# Patient Record
Sex: FEMALE | Race: WHITE | NOT HISPANIC OR LATINO | Employment: OTHER | URBAN - METROPOLITAN AREA
[De-identification: names, ages, dates, MRNs, and addresses within clinical notes are randomized per-mention and may not be internally consistent; named-entity substitution may affect disease eponyms.]

---

## 2017-07-24 ENCOUNTER — ALLSCRIPTS OFFICE VISIT (OUTPATIENT)
Dept: OTHER | Facility: OTHER | Age: 42
End: 2017-07-24

## 2017-07-24 DIAGNOSIS — Z12.31 ENCOUNTER FOR SCREENING MAMMOGRAM FOR MALIGNANT NEOPLASM OF BREAST: ICD-10-CM

## 2017-07-24 DIAGNOSIS — F41.1 GENERALIZED ANXIETY DISORDER: ICD-10-CM

## 2017-07-24 DIAGNOSIS — E66.8 OTHER OBESITY: ICD-10-CM

## 2017-07-24 DIAGNOSIS — Z87.39 PERSONAL HISTORY OF OTHER DISEASES OF THE MUSCULOSKELETAL SYSTEM AND CONNECTIVE TISSUE: ICD-10-CM

## 2017-09-21 ENCOUNTER — GENERIC CONVERSION - ENCOUNTER (OUTPATIENT)
Dept: OTHER | Facility: OTHER | Age: 42
End: 2017-09-21

## 2017-09-22 ENCOUNTER — GENERIC CONVERSION - ENCOUNTER (OUTPATIENT)
Dept: OTHER | Facility: OTHER | Age: 42
End: 2017-09-22

## 2017-09-22 LAB
BUN SERPL-MCNC: 15 MG/DL (ref 6–24)
BUN/CREA RATIO (HISTORICAL): 18 (ref 9–23)
CALCIUM SERPL-MCNC: 9.4 MG/DL (ref 8.7–10.2)
CHLORIDE SERPL-SCNC: 102 MMOL/L (ref 96–106)
CHOLEST SERPL-MCNC: 137 MG/DL (ref 100–199)
CO2 SERPL-SCNC: 23 MMOL/L (ref 18–29)
CREAT SERPL-MCNC: 0.84 MG/DL (ref 0.57–1)
EGFR AFRICAN AMERICAN (HISTORICAL): 98 ML/MIN/1.73
EGFR-AMERICAN CALC (HISTORICAL): 85 ML/MIN/1.73
GLUCOSE SERPL-MCNC: 89 MG/DL (ref 65–99)
HDLC SERPL-MCNC: 55 MG/DL
LDLC SERPL CALC-MCNC: 70 MG/DL (ref 0–99)
POTASSIUM SERPL-SCNC: 5.4 MMOL/L (ref 3.5–5.2)
SODIUM SERPL-SCNC: 140 MMOL/L (ref 134–144)
TRIGL SERPL-MCNC: 60 MG/DL (ref 0–149)

## 2017-09-25 ENCOUNTER — APPOINTMENT (OUTPATIENT)
Dept: LAB | Age: 42
End: 2017-09-25
Payer: COMMERCIAL

## 2017-09-25 ENCOUNTER — ALLSCRIPTS OFFICE VISIT (OUTPATIENT)
Dept: OTHER | Facility: OTHER | Age: 42
End: 2017-09-25

## 2017-09-25 ENCOUNTER — TRANSCRIBE ORDERS (OUTPATIENT)
Dept: ADMINISTRATIVE | Age: 42
End: 2017-09-25

## 2017-09-25 DIAGNOSIS — E87.5 HYPERPOTASSEMIA: Primary | ICD-10-CM

## 2017-09-25 DIAGNOSIS — E87.5 HYPERPOTASSEMIA: ICD-10-CM

## 2017-09-25 DIAGNOSIS — E87.5 HYPERKALEMIA: ICD-10-CM

## 2017-09-25 LAB — POTASSIUM SERPL-SCNC: 4 MMOL/L (ref 3.5–5.3)

## 2017-09-25 PROCEDURE — 84132 ASSAY OF SERUM POTASSIUM: CPT

## 2017-09-25 PROCEDURE — 36415 COLL VENOUS BLD VENIPUNCTURE: CPT

## 2017-09-26 ENCOUNTER — GENERIC CONVERSION - ENCOUNTER (OUTPATIENT)
Dept: OTHER | Facility: OTHER | Age: 42
End: 2017-09-26

## 2018-01-10 NOTE — PROGRESS NOTES
Chief Complaint  Pt presents to the office for routine follow up after undergoing Left L5-S1 minimally invasive microdiscectomy with JEROD  History of Present Illness  HPI: Pt presents for her two week post-op incision check  She reports to be doing well  Her previous pain in L buttock and down left leg into foot has resolved  She is currently only experiencing discomfort in her L heel  Aleve is effective for pain  No c/o drainage, or other s/s of infection  Denies c/o bowel or bladder  She is ambulating cautiously but with a steady gate  She remains compliant with activity restrictions  Active Problems    1  Back pain with left-sided sciatica (724 3) (M54 32)   2  Encounter for screening mammogram for breast cancer (V76 12) (Z12 31)   3  Fatigue (780 79) (R53 83)   4  Generalized anxiety disorder (300 02) (F41 1)   5  History of low back pain (V13 59) (Z87 39)   6  Intervertebral disc disorders with radiculopathy, lumbosacral region (724 4) (M51 17)   7  Lumbar degenerative disc disease (722 52) (M51 36)   8  Lumbar radiculopathy (724 4) (M54 16)   9  Muscle cramps (729 82) (R25 2)   10  Unstable balance (781 2) (R26 89)    Current Meds   1  Cyclobenzaprine HCl - 5 MG Oral Tablet; TAKE 1 TABLET EVERY 8 HOURS AS   NEEDED FOR PAIN - MAY CAUSE DROWSINESS; Therapy: [id number] to (Last Rx:89Tct2849)  Requested for: 15Apr2016 Ordered   2  Multiple Vitamin Oral Tablet; TAKE 1 TABLET DAILY; Therapy: (Recorded:46Pej9950) to Recorded   3  Naproxen 500 MG Oral Tablet; TAKE 1 TABLET Every twelve hours PRN pain - take with   food; Therapy: 50EZH6525 to (Bette Hunting)  Requested for: 15Apr2016; Last   Rx:[id number] Ordered   4  Natural Bitterness Powder; herbal powder: long dan pat, chetan zi, [facility], [address], [place], [place], [place], [place], [address] [facility] [address], [place] -- twice daily; Therapy: (Recorded:48Sbo4856) to Recorded    Allergies    1  No Known Drug Allergies    2   Bee sting    Vitals  Signs [Data Includes: Current Encounter]    Temperature: 98 7 F, Tympanic  Heart Rate: 88, L Brachial Artery  Pulse Quality: Normal, L Brachial Artery  Respiration: 16  Respiration Quality: Normal  Systolic: 270, LUE, Sitting  Diastolic: 78, LUE, Sitting  Height: 5 ft 6 in  Weight: 222 lb   BMI Calculated: 35 83  BSA Calculated: 2 09    Procedure  The wound was located on the (Midline lumbar)  Wound Exam: well healed with no sign of infection, Incision is clean, dry, and intact with moderate scabbing noted  There was mild erythema around the wound edges  Procedure Note:   Patient Status:  the patient tolerated the procedure well  Complications:  there were no complications  Assessment    1  Postop check (V67 00) (Z09)    Plan  Postop check    · Follow-up Visit in 4 Weeks Evaluation and Treatment  Follow-up  Status: Complete -  Retrospective By Protocol Authorization  Done: [id number]    Discussion/Summary    Routine incision care reviewed  Wash with mild soap and water, and pat dry  Do not immerse under water such as in bath tub, hot tub, or pool  Watch for s/s of infection such as increased redness, drainage, gapping of incision, chills, and fever greater than 101 and call the office or present to the ED  F/U as previously scheduled  Future Appointments    Date/Time Provider Specialty Site   07/19/2016 10:15 AM Sabrina Litten, M D   [address] [place] [address] NEUROSURGICAL     Signatures   Electronically signed by : Gisselle Scott, ; Jun 21 2016  2:52PM EST                       (Author)    Electronically signed by : LUIZA Melendez ; Jun 21 2016  2:59PM EST

## 2018-01-12 NOTE — MISCELLANEOUS
Provider Comments  Provider Comments:   Pt  a no show for 45 min EDX appointment   noted that pt  is scheduled for surgery        Signatures   Electronically signed by : Savita Landers DO; May  2 2016  3:00PM EST                       (Author)

## 2018-01-13 VITALS
HEIGHT: 66 IN | OXYGEN SATURATION: 98 % | WEIGHT: 227 LBS | HEART RATE: 100 BPM | RESPIRATION RATE: 18 BRPM | SYSTOLIC BLOOD PRESSURE: 118 MMHG | TEMPERATURE: 97.6 F | DIASTOLIC BLOOD PRESSURE: 80 MMHG | BODY MASS INDEX: 36.48 KG/M2

## 2018-01-13 NOTE — CONSULTS
Assessment    1  Lumbar radiculopathy (724 4) (M54 16)    Plan  Back pain with left-sided sciatica, Lumbar radiculopathy    · 1 - Isamar Chapman MD, Inessa BENAVIDEZ (Pain Management) Physician Referral  Consult consider ITALIA  clearly has left S-1 radiculopathy  Status: Active  Requested for: [id number]  Care Summary provided  : Yes  Lumbar radiculopathy    · MethylPREDNISolone (Hollis) 4 MG Oral Tablet; TAKE AS DIRECTED ON PATIENT  INSTRUCTION CARD   · EMG ONE EXTREMITY WITH OR W/O RELATED PARASPINAL AREAS; Status:Hold For -  Scheduling; Requested for:31Mar2016;   ONE : LLE    Discussion/Summary  Impression: low back pain, disc herniation and radiculopathy  Currently, the condition is unchanged  The diagnostic plan includes electromyography  Medication changes are as documented in orders  continue cyclobenzaprine  Treatment plan includes pain medicine consultation  Patient discussion: discussed with the patient, stop naproxyn while on methylprednisolone  Chief Complaint  LBP and left leg Sx  Consult from Dr Liliane Hull      History of Present Illness  37 yo female with left radicular pain, radiates to left foot in plantar and lateral foot  Onset October 2015  Has had chiropractic, and acupressure  Seen in ED [facility] and MRI done, 3/20 has been taking NSAIDS, opioids, and also prednisone form [facility]  No bowel or bladder incontinence,   [facility] referred to [address] [place] [address] [address] [facility]  Review of Systems    Constitutional: No fever, no chills, feels well, no tiredness, no recent weight gain or loss  Eyes: No complaints of eyesight problems, no red eyes  ENT: no loss of hearing, no nosebleeds, no sore throat  Cardiovascular: No complaints of chest pain, no palpitations, no leg claudication or lower extremity edema  Respiratory: no compliants of shortness of breath, no wheezing, no cough  Gastrointestinal: constipation  Genitourinary: no complaints of dysuria, no incontinence  Musculoskeletal: as noted in HPI     Integumentary: no complaints of skin rash or lesion, no itching or dry skin, no skin wounds  Neurological: as noted in HPI  Psychiatric: No suicidal thoughts, no anxiety, no feelings of depression  Active Problems    1  Back pain with left-sided sciatica (724 3) (M54 32)   2  Chest pain (786 50) (R07 9)   3  Fatigue (780 79) (R53 83)   4  Generalized anxiety disorder (300 02) (F41 1)   5  History of low back pain (V13 59) (Z87 39)   6  Iron deficiency anemia (280 9) (D50 9)   7  Nonvenomous Insect Bite Of Forearm (913 4)   8  Palpitations (785 1) (R00 2)   9  Shortness of breath (786 05) (R06 02)    Past Medical History    1  History of Anxiety (300 00) (F41 9)    The active problems and past medical history were reviewed and updated today  Surgical History    1  History of  Section    The surgical history was reviewed and updated today  Family History    1  Family history of diabetes mellitus (V18 0) (Z83 3)    2  Family history of hypertension (V17 49) (Z82 49)   3  Family history of Heart problem    4  Family history of lung cancer (V16 1) (Z80 1)    5  Family history of Diabetes Mellitus (V18 0)   6  Family history of Hypertension (V17 49)    The family history was reviewed and updated today  Social History    · Currently working   · Never A Smoker  The social history was reviewed and is unchanged  Current Meds   1  Cyclobenzaprine HCl - 5 MG Oral Tablet; TAKE 1 TABLET EVERY 8 HOURS AS NEEDED   FOR PAIN - MAY CAUSE DROWSINESS; Therapy: 45LXQ7387 to (Gabriela Shea)  Requested for: 2016; Last   Rx:2016 Ordered   2  Flexeril 10 MG TABS; TAKE 1 TABLET EVERY 8 HOURS AS NEEDED; Therapy: (Roopa Lavender) to Recorded   3  Hydrocodone-Acetaminophen 5-325 MG Oral Tablet; take 1 tablet every 12 hours as   needed for pain; Therapy: 76RBS1397 to (Evaluate:2016); Last Rx:2016 Ordered   4  Multiple Vitamin Oral Tablet Recorded   5   Naproxen 500 MG Oral Tablet; TAKE 1 TABLET Every twelve hours PRN pain - take with   food; Therapy: 06IFD7359 to (Evaluate:14Apr2016)  Requested for: 22Mar2016; Last   Rx:22Mar2016 Ordered    The medication list was reviewed and updated today  Allergies    1  No Known Drug Allergies    Vitals  Signs [Data Includes: Current Encounter]   Recorded: 64PWS5690 02:03PM   Heart Rate: 438  Systolic: 427  Diastolic: 96  Height: 5 ft 6 in  Weight: 220 lb 2 08 oz  BMI Calculated: 35 53  BSA Calculated: 2 09    Physical Exam  unable to single limb plantarflex  SLR actually Neg  Constitutional - General appearance: Abnormal  overweight  Neurologic - Cranial nerves: Normal  Reflexes: Abnormal  Deep tendon reflexes: 0 left ankle jerk2+ right biceps, 2+ left biceps, 2+ right triceps, 2+ left triceps, 2+ right brachioradialis, 2+ left brachioradialis, 2+ right patella, 2+ left patella, 2+ right ankle jerk, no ankle clonus on the right and no ankle clonus on the left  Sensation: Abnormal diminished left S-1  Psychiatric - Orientation to person, place, and time: Normal  Mood and affect: Abnormal  Mood and Affect: silly and nervous laughter (?)  Eyes   Pupils and irises: Normal   (did not take opioids today  ) Pupils:  did not take pain medicine today  pupils mid point  Cornea, Lens, and Sclera:      Results/Data    Diagnostic Review extruded disc at L-5/S-1 displacement left S-1        Future Appointments    Date/Time Provider Specialty Site   04/15/2016 08:30 AM Henri Anderson DO Internal Medicine [facility] INTERNAL MED     Signatures   Electronically signed by : Princess Callahan DO; Mar 31 2016  2:46PM EST                       (Author)

## 2018-01-14 VITALS
RESPIRATION RATE: 16 BRPM | TEMPERATURE: 98.6 F | DIASTOLIC BLOOD PRESSURE: 62 MMHG | OXYGEN SATURATION: 98 % | BODY MASS INDEX: 36.8 KG/M2 | WEIGHT: 228 LBS | HEART RATE: 96 BPM | SYSTOLIC BLOOD PRESSURE: 102 MMHG

## 2018-01-15 NOTE — RESULT NOTES
Verified Results  (1) POTASSIUM [id number] 09:55AM Ardeen Liter     Test Name Result Flag Reference   POTASSIUM 4 0 mmol/L  3 5-5 3

## 2018-01-15 NOTE — MISCELLANEOUS
Message  S/w pt on telephone after multiple attempts to reach patient over the past couple of days  Patient reports feeling well  She stated that she has not taken any pain medications, but she has been applying ice here and there  Dressing is D&I  Stated that she had some nausea yesterday but denied any vomiting/fever/chills/s&s infection  Verified follow up appointment date and time with patient and advised her to call our office if she has any questions or concerns  Patient verbalized understanding        Signatures   Electronically signed by : Peng Grace RN; Jun 8 2016  3:53PM EST                       (Author)
28-Nov-2017

## 2018-01-17 NOTE — MISCELLANEOUS
Message  L/M for pt to review preop instructions for surgery scheduled 6/6/16 with Dr Terry Los  Active Problems    1  Back pain with left-sided sciatica (724 3) (M54 32)   2  Encounter for screening mammogram for breast cancer (V76 12) (Z12 31)   3  Fatigue (780 79) (R53 83)   4  Generalized anxiety disorder (300 02) (F41 1)   5  History of low back pain (V13 59) (Z87 39)   6  Intervertebral disc disorders with radiculopathy, lumbosacral region (724 4) (M51 17)   7  Lumbar degenerative disc disease (722 52) (M51 36)   8  Lumbar radiculopathy (724 4) (M54 16)   9  Muscle cramps (729 82) (R25 2)   10  Unstable balance (781 2) (R26 89)    Current Meds   1  Cyclobenzaprine HCl - 5 MG Oral Tablet; TAKE 1 TABLET EVERY 8 HOURS AS   NEEDED FOR PAIN - MAY CAUSE DROWSINESS; Therapy: [id number] to (Last Rx:41Heu3248)  Requested for: 15Apr2016 Ordered   2  Multiple Vitamin Oral Tablet; TAKE 1 TABLET DAILY; Therapy: (Recorded:50Tff7661) to Recorded   3  Naproxen 500 MG Oral Tablet; TAKE 1 TABLET Every twelve hours PRN pain - take with   food; Therapy: 76QBQ5165 to (Familiaonica Deni)  Requested for: 15Apr2016; Last   Rx:[id number] Ordered   4  Natural Bitterness Powder; herbal powder: long dan pat, chetan deutsch, [facility], [address], [place], [place], [place], [place], [address] [place] -- twice daily; Therapy: (Recorded:01Ezp8977) to Recorded    Allergies    1  No Known Drug Allergies    2   Bee sting    Signatures   Electronically signed by : Echo Rdz RN; Jun 2 2016 11:11AM EST                       (Author)

## 2018-06-19 ENCOUNTER — TRANSCRIBE ORDERS (OUTPATIENT)
Dept: ADMINISTRATIVE | Facility: HOSPITAL | Age: 43
End: 2018-06-19

## 2018-06-19 DIAGNOSIS — R40.0 DAYTIME SLEEPINESS: Primary | ICD-10-CM

## 2019-04-17 ENCOUNTER — TELEPHONE (OUTPATIENT)
Dept: INTERNAL MEDICINE CLINIC | Facility: CLINIC | Age: 44
End: 2019-04-17

## 2019-08-16 ENCOUNTER — OFFICE VISIT (OUTPATIENT)
Dept: INTERNAL MEDICINE CLINIC | Facility: CLINIC | Age: 44
End: 2019-08-16
Payer: COMMERCIAL

## 2019-08-16 VITALS
OXYGEN SATURATION: 98 % | TEMPERATURE: 98.6 F | HEIGHT: 66 IN | RESPIRATION RATE: 16 BRPM | WEIGHT: 240.6 LBS | HEART RATE: 94 BPM | DIASTOLIC BLOOD PRESSURE: 80 MMHG | BODY MASS INDEX: 38.67 KG/M2 | SYSTOLIC BLOOD PRESSURE: 126 MMHG

## 2019-08-16 DIAGNOSIS — F41.1 GENERALIZED ANXIETY DISORDER: Primary | ICD-10-CM

## 2019-08-16 DIAGNOSIS — M62.838 MUSCLE SPASM: ICD-10-CM

## 2019-08-16 DIAGNOSIS — Z12.31 ENCOUNTER FOR SCREENING MAMMOGRAM FOR BREAST CANCER: ICD-10-CM

## 2019-08-16 DIAGNOSIS — M51.36 LUMBAR DEGENERATIVE DISC DISEASE: ICD-10-CM

## 2019-08-16 DIAGNOSIS — M51.17 INTERVERTEBRAL DISC DISORDERS WITH RADICULOPATHY, LUMBOSACRAL REGION: ICD-10-CM

## 2019-08-16 DIAGNOSIS — M54.2 NECK PAIN: ICD-10-CM

## 2019-08-16 PROBLEM — E87.5 SERUM POTASSIUM ELEVATED: Status: ACTIVE | Noted: 2017-09-22

## 2019-08-16 PROBLEM — E87.5 SERUM POTASSIUM ELEVATED: Status: RESOLVED | Noted: 2017-09-22 | Resolved: 2019-08-16

## 2019-08-16 PROCEDURE — 1036F TOBACCO NON-USER: CPT | Performed by: INTERNAL MEDICINE

## 2019-08-16 PROCEDURE — 3008F BODY MASS INDEX DOCD: CPT | Performed by: INTERNAL MEDICINE

## 2019-08-16 PROCEDURE — 99214 OFFICE O/P EST MOD 30 MIN: CPT | Performed by: INTERNAL MEDICINE

## 2019-08-16 RX ORDER — CYCLOBENZAPRINE HCL 10 MG
10 TABLET ORAL DAILY PRN
Qty: 30 TABLET | Refills: 0 | Status: SHIPPED | OUTPATIENT
Start: 2019-08-16 | End: 2020-01-29 | Stop reason: SDUPTHER

## 2019-08-16 RX ORDER — BUPROPION HYDROCHLORIDE 150 MG/1
150 TABLET ORAL DAILY
Qty: 30 TABLET | Refills: 1 | Status: SHIPPED | OUTPATIENT
Start: 2019-08-16 | End: 2019-10-17 | Stop reason: SDUPTHER

## 2019-08-16 RX ORDER — VITAMIN B COMPLEX
1 CAPSULE ORAL DAILY
COMMUNITY

## 2019-08-16 RX ORDER — BUSPIRONE HYDROCHLORIDE 5 MG/1
5 TABLET ORAL 3 TIMES DAILY PRN
Qty: 30 TABLET | Refills: 1 | Status: SHIPPED | OUTPATIENT
Start: 2019-08-16 | End: 2020-01-29 | Stop reason: SDUPTHER

## 2019-08-16 NOTE — PATIENT INSTRUCTIONS
Start taking Wellbutrin (bupropion) daily  May take buspirone as needed in about a week  Relaxation and Meditation   WHAT YOU NEED TO KNOW:   Relaxation and meditation can help decrease pain, stress, and anxiety  Relaxation and meditation also help regulate your breathing and decrease your blood pressure and heartbeat  DISCHARGE INSTRUCTIONS:   Types of relaxation:   · Slow, deep breathing  can help relax your body and mind  Deep breathing can be done at any time  · Progressive muscle relaxation  decreases tense muscles  With this therapy, you relax certain muscle groups until your entire body is relaxed  Start at one end of your body and move to the other end, such as from your feet to your head  · Autogenic training, or self-control relaxation , helps increase the blood flow to your limbs and helps you sleep  With this therapy, you try to replace painful or uncomfortable thoughts and feelings with pleasant ones  · Guided imagery  uses your imagination to help you feel peaceful and calm  You try to see, hear, smell, and taste things that you picture in your mind  For example, you might imagine lying on a beach, feeling the sand, and hearing the waves  · Distraction  uses activities you enjoy to help you take your mind off of pain, stress, or anxiety  Distraction may include, listening to music, painting, reading a book, or exercise  Types of meditation:  Meditation is a mind exercise that helps to relax your body and free your mind of worry  You sit quietly in a comfortable position, close your eyes, and relax your muscles  Your thoughts are relaxed while your body stays alert  Meditation can be done alone or with other people  · Mantra meditation  is when you think or speak a certain word or phrase over and over  The word or phrase often has a smooth sound  The mantra is used as a way to help you focus   The sound is believed to make vibrations that have different effects on people  · Mindfulness meditation  is when you focus on what is happening in your life at that point in time  You become aware of your thoughts and feelings in the present without making any judgment  You learn not to worry about your past and future  © 2017 [address] [employer] Information is for End User's use only and may not be sold, redistributed or otherwise used for commercial purposes  All illustrations and images included in CareNotes® are the copyrighted property of A D A M , Inc  or G[employer]alo Sutton  The above information is an  only  It is not intended as medical advice for individual conditions or treatments  Talk to your doctor, nurse or pharmacist before following any medical regimen to see if it is safe and effective for you

## 2019-08-16 NOTE — PROGRESS NOTES
Assessment/Plan:    Generalized anxiety disorder  Start bupropion, Buspar prn  Continue with therapist weekly  Discussed relaxation techniques  Neck pain  More frequent pain with radicular symptoms, check x-ray  Intervertebral disc disorders with radiculopathy, lumbosacral region  Stable  Muscle spasm  Restart Flexeril, takes as needed  May continue with ibuprofen prn, limit use of Excedrin  Diagnoses and all orders for this visit:    Generalized anxiety disorder  -     buPROPion (WELLBUTRIN XL) 150 mg 24 hr tablet; Take 1 tablet (150 mg total) by mouth daily  -     busPIRone (BUSPAR) 5 mg tablet; Take 1 tablet (5 mg total) by mouth 3 (three) times a day as needed (anxiety)    Neck pain  -     Vitamin B12  -     XR spine cervical complete 4 or 5 vw non injury; Future  -     cyclobenzaprine (FLEXERIL) 10 mg tablet; Take 1 tablet (10 mg total) by mouth daily as needed for muscle spasms    Encounter for screening mammogram for breast cancer  -     Mammo screening bilateral w cad; Future    Intervertebral disc disorders with radiculopathy, lumbosacral region  -     Vitamin B12  -     cyclobenzaprine (FLEXERIL) 10 mg tablet; Take 1 tablet (10 mg total) by mouth daily as needed for muscle spasms    Muscle spasm  -     cyclobenzaprine (FLEXERIL) 10 mg tablet; Take 1 tablet (10 mg total) by mouth daily as needed for muscle spasms    Lumbar degenerative disc disease    Other orders  -     b complex vitamins capsule; Take 1 capsule by mouth daily  -     other medication, see sig,; Adren- Plus 2x daily  -     L-THEANINE PO; Take by mouth 2 (two) times a day  -     other medication, see sig,; Natural T- relief 4x daily  -     Boswellia Radha (BOSWELLIA PO); Take 400 mg by mouth daily  -     Discontinue: MAGNESIUM PO; Take by mouth  -     other medication, see sig,; Brain Support  -     other medication, see sig,; AnxioCalm PRN  -     Aspirin-Acetaminophen-Caffeine (EXCEDRIN PO);  Take by mouth      Follow up in 6 weeks or as needed  Subjective:      Patient ID: Georgiana Dwyer is a 39 y o  female  Joann Ambriz has several concerns  First, she complains of intermittent low back pain  She had surgery about 3 years ago, would experience occasional numbness in pain  Denies any radiating pain down her legs  She would take Excedrin and Motrin as needed  Second, she complains of more frequent neck pain, frequently radiating down her left shoulder and hands  Denies any recent injury  She reports taking Excedrin, 2 tablets with Motrin at least once a week  She used to take Flexeril in the past for muscle spasms, reports that spasms have been worse in her neck and low back area  Third, she complains of more frequent panic attacks  She admits having anxiety in the past   She had tried Paxil and Lexapro which caused side effects  She is seeing a therapist weekly who suggested that she start Ativan  She has no issues with sleep  She reports panic attacks would occur at least once a week  She thinks it is related to her menstrual cycle  Lastly, she reports waking up in the middle of the night and unable to move  This has happened in the past, has not occurred recently  She did see a naturopath, prescribed vitamins including magnesium  This caused diarrhea  She has not been taking her supplements regularly  The following portions of the patient's history were reviewed and updated as appropriate: allergies, current medications, past medical history, past social history and problem list     Review of Systems   Constitutional: Negative for fatigue  Respiratory: Negative for cough and shortness of breath  Cardiovascular: Negative for chest pain and palpitations  Musculoskeletal: Positive for arthralgias, back pain, neck pain and neck stiffness  Neurological: Positive for numbness  Negative for dizziness and headaches  Psychiatric/Behavioral: Negative for agitation, dysphoric mood and sleep disturbance  The patient is nervous/anxious  Objective:      /80   Pulse 94   Temp 98 6 °F (37 °C)   Resp 16   Ht 5' 6" (1 676 m)   Wt 109 kg (240 lb 9 6 oz)   SpO2 98%   BMI 38 83 kg/m²          Physical Exam   Constitutional: She is oriented to person, place, and time  She appears well-developed and well-nourished  HENT:   Head: Normocephalic and atraumatic  Nose: Nose normal    Eyes: Pupils are equal, round, and reactive to light  Conjunctivae are normal    Neck: Neck supple  Cardiovascular: Normal rate, regular rhythm and normal heart sounds  Pulmonary/Chest: Effort normal and breath sounds normal  She has no wheezes  She has no rales  Abdominal: Soft  Bowel sounds are normal    Musculoskeletal: She exhibits no edema  Cervical back: She exhibits spasm  Thoracic back: She exhibits spasm  Lumbar back: She exhibits spasm  Neurological: She is alert and oriented to person, place, and time  Skin: Skin is warm  No rash noted  Psychiatric: Her behavior is normal  Her mood appears anxious  Her speech is rapid and/or pressured  Nursing note and vitals reviewed  BMI Counseling: Body mass index is 38 83 kg/m²  Discussed the patient's BMI with her  The BMI is above average  BMI counseling and education was provided to the patient  Nutrition recommendations include reducing portion sizes and 3-5 servings of fruits/vegetables daily

## 2019-09-24 LAB — VIT B12 SERPL-MCNC: 555 PG/ML (ref 200–1100)

## 2019-09-25 ENCOUNTER — OFFICE VISIT (OUTPATIENT)
Dept: INTERNAL MEDICINE CLINIC | Facility: CLINIC | Age: 44
End: 2019-09-25
Payer: COMMERCIAL

## 2019-09-25 VITALS
TEMPERATURE: 98.7 F | HEIGHT: 66 IN | BODY MASS INDEX: 38.57 KG/M2 | SYSTOLIC BLOOD PRESSURE: 126 MMHG | RESPIRATION RATE: 16 BRPM | HEART RATE: 91 BPM | DIASTOLIC BLOOD PRESSURE: 86 MMHG | OXYGEN SATURATION: 99 % | WEIGHT: 240 LBS

## 2019-09-25 DIAGNOSIS — F32.0 CURRENT MILD EPISODE OF MAJOR DEPRESSIVE DISORDER WITHOUT PRIOR EPISODE (HCC): ICD-10-CM

## 2019-09-25 DIAGNOSIS — F41.1 GENERALIZED ANXIETY DISORDER: Primary | ICD-10-CM

## 2019-09-25 DIAGNOSIS — M62.838 MUSCLE SPASM: ICD-10-CM

## 2019-09-25 PROBLEM — E66.09 CLASS 2 OBESITY DUE TO EXCESS CALORIES WITHOUT SERIOUS COMORBIDITY WITH BODY MASS INDEX (BMI) OF 38.0 TO 38.9 IN ADULT: Status: ACTIVE | Noted: 2019-09-25

## 2019-09-25 PROCEDURE — 99213 OFFICE O/P EST LOW 20 MIN: CPT | Performed by: INTERNAL MEDICINE

## 2019-09-25 PROCEDURE — 3008F BODY MASS INDEX DOCD: CPT | Performed by: INTERNAL MEDICINE

## 2019-09-25 NOTE — PROGRESS NOTES
Assessment/Plan:    Generalized anxiety disorder  Improved, on bupropion  She took buspirone once only  She will continue seeing her therapist weekly  Current mild episode of major depressive disorder without prior episode ([address] )  Symptoms worse since anxiety better controlled  Would increase bupropion to 300 mg, monitor symptoms for now  Consider sleep study also  Neck pain  Improved, since anxiety better controlled  No need to to x-ray for now  Muscle spasm  May take muscle relaxant qHS prn  Diagnoses and all orders for this visit:    Generalized anxiety disorder    Muscle spasm    Current mild episode of major depressive disorder without prior episode [facility])      May continue taking supplements for now  Recommend to take B complex a few days a week  Follow up in 2 months or as needed  Subjective:      Patient ID: Jonathan Gandhi is a 38 y o  female  Stephaniapetey Sim is feeling better  She reports that her anxiety is better controlled  She noticed that she is not experiencing numbness of her hands anymore since anxiety is better  She is not fighting with her  every day anymore, feels more settled down  She reports no further episodes of panic attacks, does not experience palpitations all day  However, she noticed that she is feeling a little bit more depressed  She has no energy when she wakes up in the morning sometimes, did not have this before  She reports sleeping well at night  She continues to see her therapist on a weekly basis  She has been taking Wellbutrin daily  She only took BuSpar 1 time, reports that it caused her to feel very sleepy  She was unsure if it was due to the combination of her other supplements as well  She did take the Flexeril a few times due to muscle spasms  She started taking her natural supplements again since she experience more frequent hot flashes and felt more tired    Since starting it again about 2 weeks ago, she is feeling slightly better  The following portions of the patient's history were reviewed and updated as appropriate: allergies, current medications, past medical history, past social history and problem list     Review of Systems   Constitutional: Negative for activity change and appetite change  Respiratory: Negative for shortness of breath  Cardiovascular: Negative for chest pain and palpitations  Musculoskeletal: Negative for neck pain and neck stiffness  Neurological: Negative for tremors  Psychiatric/Behavioral: Positive for dysphoric mood  Negative for confusion and sleep disturbance  The patient is not nervous/anxious  Objective:      /86   Pulse 91   Temp 98 7 °F (37 1 °C)   Resp 16   Ht 5' 6" (1 676 m)   Wt 109 kg (240 lb)   SpO2 99%   BMI 38 74 kg/m²          Physical Exam   Constitutional: She is oriented to person, place, and time  She appears well-developed and well-nourished  HENT:   Head: Normocephalic  Eyes: Pupils are equal, round, and reactive to light  Pulmonary/Chest: Effort normal    Neurological: She is alert and oriented to person, place, and time  Skin: Skin is warm  Psychiatric: Her mood appears not anxious  Her speech is not rapid and/or pressured  She is not slowed  She does not exhibit a depressed mood  Nursing note and vitals reviewed

## 2019-09-25 NOTE — ASSESSMENT & PLAN NOTE
Improved, on bupropion  She took buspirone once only  She will continue seeing her therapist weekly

## 2019-10-17 DIAGNOSIS — F41.1 GENERALIZED ANXIETY DISORDER: ICD-10-CM

## 2019-10-17 RX ORDER — BUPROPION HYDROCHLORIDE 150 MG/1
150 TABLET ORAL DAILY
Qty: 90 TABLET | Refills: 1 | Status: SHIPPED | OUTPATIENT
Start: 2019-10-17 | End: 2020-04-06 | Stop reason: SDUPTHER

## 2019-10-17 NOTE — TELEPHONE ENCOUNTER
Patient would like 3 month supply if possible      Patient took last one last night needs refilled so she can take tonight

## 2019-11-27 ENCOUNTER — OFFICE VISIT (OUTPATIENT)
Dept: INTERNAL MEDICINE CLINIC | Facility: CLINIC | Age: 44
End: 2019-11-27
Payer: COMMERCIAL

## 2019-11-27 VITALS
WEIGHT: 235.2 LBS | OXYGEN SATURATION: 99 % | BODY MASS INDEX: 37.8 KG/M2 | HEART RATE: 98 BPM | RESPIRATION RATE: 18 BRPM | SYSTOLIC BLOOD PRESSURE: 124 MMHG | DIASTOLIC BLOOD PRESSURE: 88 MMHG | TEMPERATURE: 97.8 F | HEIGHT: 66 IN

## 2019-11-27 DIAGNOSIS — M51.17 INTERVERTEBRAL DISC DISORDERS WITH RADICULOPATHY, LUMBOSACRAL REGION: ICD-10-CM

## 2019-11-27 DIAGNOSIS — F32.0 CURRENT MILD EPISODE OF MAJOR DEPRESSIVE DISORDER WITHOUT PRIOR EPISODE (HCC): Primary | ICD-10-CM

## 2019-11-27 DIAGNOSIS — R79.89 ELEVATED LFTS: ICD-10-CM

## 2019-11-27 DIAGNOSIS — E66.09 CLASS 2 OBESITY DUE TO EXCESS CALORIES WITHOUT SERIOUS COMORBIDITY WITH BODY MASS INDEX (BMI) OF 38.0 TO 38.9 IN ADULT: ICD-10-CM

## 2019-11-27 DIAGNOSIS — F41.1 GENERALIZED ANXIETY DISORDER: ICD-10-CM

## 2019-11-27 PROCEDURE — 99213 OFFICE O/P EST LOW 20 MIN: CPT | Performed by: INTERNAL MEDICINE

## 2019-11-27 NOTE — PROGRESS NOTES
Assessment/Plan:    Generalized anxiety disorder  Stable, rare panic attacks  Maintain on bupropion, may take buspirone prn  Sees counselor weekly  Current mild episode of major depressive disorder without prior episode ([facility] )  Stable  Class 2 obesity due to excess calories without serious comorbidity with body mass index (BMI) of 38 0 to 38 9 in adult  Lost 5 lbs since last visit  Intervertebral disc disorders with radiculopathy, lumbosacral region  Continue regular ROM exercises, may take muscle relaxant prn  Lumbar degenerative disc disease  Stable  Diagnoses and all orders for this visit:    Current mild episode of major depressive disorder without prior episode ([facility] )    Intervertebral disc disorders with radiculopathy, lumbosacral region    Class 2 obesity due to excess calories without serious comorbidity with body mass index (BMI) of 38 0 to 38 9 in adult    Elevated LFTs  Comments:  ALT slightly elevated, may be due to excesive Tylenol use  Orders:  -     Hepatic function panel    Generalized anxiety disorder      Follow up in 2 months or as needed  Subjective:      Patient ID: Gucci Villatoro is a 38 y o  female  Pauline Ahn has been doing alright  She reports feeling very stressed this week, had 3 days in a row with increased stress at home and at work  She took buspirone twice in a day, felt better afterwards  It normally causes her to be sleepy but this time it did not  She is otherwise feeling better  Since her last visit 2 months ago, she had about 2 major panic attacks only  She reports more frequent neck pain and soreness  She gave a massage earlier today and attributes the pain to that  She does take the muscle relaxant as needed  She is asking if she needs to continue all the supplements that she has been taking  She recalls that when she stopped taking the adrenal supplements she felt her anxiety worsen  She does see the osteopath next year      The following portions of the patient's history were reviewed and updated as appropriate: allergies, current medications, past medical history, past social history and problem list     Review of Systems   Constitutional: Negative for activity change and appetite change  Respiratory: Negative for shortness of breath  Cardiovascular: Negative for chest pain and palpitations  Genitourinary: Negative for difficulty urinating, dyspareunia, dysuria, enuresis and frequency  Musculoskeletal: Positive for arthralgias, neck pain and neck stiffness  Neurological: Negative for dizziness and headaches  Psychiatric/Behavioral: Positive for dysphoric mood  Negative for agitation, behavioral problems, confusion and sleep disturbance  The patient is nervous/anxious  Objective:      /88   Pulse 98   Temp 97 8 °F (36 6 °C) (Oral)   Resp 18   Ht 5' 6" (1 676 m)   Wt 107 kg (235 lb 3 2 oz)   SpO2 99%   BMI 37 96 kg/m²          Physical Exam   Constitutional: She is oriented to person, place, and time  She appears well-developed and well-nourished  HENT:   Head: Normocephalic  Cardiovascular: Normal rate and regular rhythm  Pulmonary/Chest: Effort normal and breath sounds normal    Neurological: She is alert and oriented to person, place, and time  Skin: Skin is warm  Psychiatric: Her mood appears not anxious  Her speech is not rapid and/or pressured  Cognition and memory are normal  She does not exhibit a depressed mood  Nursing note and vitals reviewed

## 2020-01-28 ENCOUNTER — HOSPITAL ENCOUNTER (OUTPATIENT)
Dept: RADIOLOGY | Age: 45
Discharge: HOME | End: 2020-01-28
Payer: COMMERCIAL

## 2020-01-28 ENCOUNTER — TELEPHONE (OUTPATIENT)
Dept: INTERNAL MEDICINE CLINIC | Facility: CLINIC | Age: 45
End: 2020-01-28

## 2020-01-28 VITALS — BODY MASS INDEX: 37.28 KG/M2 | HEIGHT: 66 IN | WEIGHT: 232 LBS

## 2020-01-28 DIAGNOSIS — Z12.31 ENCOUNTER FOR SCREENING MAMMOGRAM FOR BREAST CANCER: ICD-10-CM

## 2020-01-28 LAB
ALBUMIN SERPL-MCNC: 4.3 G/DL (ref 3.6–5.1)
ALBUMIN/GLOB SERPL: 2 (CALC) (ref 1–2.5)
ALP SERPL-CCNC: 50 U/L (ref 33–115)
ALT SERPL-CCNC: 30 U/L (ref 6–29)
AST SERPL-CCNC: 19 U/L (ref 10–35)
BILIRUB DIRECT SERPL-MCNC: 0.1 MG/DL
BILIRUB INDIRECT SERPL-MCNC: 0.4 MG/DL (CALC) (ref 0.2–1.2)
BILIRUB SERPL-MCNC: 0.5 MG/DL (ref 0.2–1.2)
GLOBULIN SER CALC-MCNC: 2.1 G/DL (CALC) (ref 1.9–3.7)
PROT SERPL-MCNC: 6.4 G/DL (ref 6.1–8.1)

## 2020-01-28 PROCEDURE — 77067 SCR MAMMO BI INCL CAD: CPT

## 2020-01-28 NOTE — TELEPHONE ENCOUNTER
One of your liver enzymes remain a bit high (by one point), better from previous  Nothing to do, we will continue to monitor this  Continue low fat diet  Dorsal Nasal Flap Text: The defect edges were debeveled with a #15 scalpel blade.  Given the location of the defect and the proximity to free margins a dorsal nasal flap was deemed most appropriate.  Using a sterile surgical marker, an appropriate dorsal nasal flap was drawn around the defect.    The area thus outlined was incised deep to adipose tissue with a #15 scalpel blade.  The skin margins were undermined to an appropriate distance in all directions utilizing iris scissors.

## 2020-01-29 ENCOUNTER — OFFICE VISIT (OUTPATIENT)
Dept: INTERNAL MEDICINE CLINIC | Facility: CLINIC | Age: 45
End: 2020-01-29
Payer: COMMERCIAL

## 2020-01-29 VITALS
SYSTOLIC BLOOD PRESSURE: 120 MMHG | RESPIRATION RATE: 18 BRPM | HEIGHT: 66 IN | OXYGEN SATURATION: 96 % | BODY MASS INDEX: 37.86 KG/M2 | HEART RATE: 96 BPM | DIASTOLIC BLOOD PRESSURE: 80 MMHG | TEMPERATURE: 96.6 F | WEIGHT: 235.6 LBS

## 2020-01-29 DIAGNOSIS — M62.838 MUSCLE SPASM: ICD-10-CM

## 2020-01-29 DIAGNOSIS — Z79.899 ENCOUNTER FOR LONG-TERM CURRENT USE OF MEDICATION: Primary | ICD-10-CM

## 2020-01-29 DIAGNOSIS — R79.89 ELEVATED LFTS: ICD-10-CM

## 2020-01-29 DIAGNOSIS — M54.2 NECK PAIN: ICD-10-CM

## 2020-01-29 DIAGNOSIS — E55.9 VITAMIN D INSUFFICIENCY: ICD-10-CM

## 2020-01-29 DIAGNOSIS — Z13.220 SCREENING, LIPID: ICD-10-CM

## 2020-01-29 DIAGNOSIS — F32.0 CURRENT MILD EPISODE OF MAJOR DEPRESSIVE DISORDER WITHOUT PRIOR EPISODE (HCC): ICD-10-CM

## 2020-01-29 DIAGNOSIS — M51.17 INTERVERTEBRAL DISC DISORDERS WITH RADICULOPATHY, LUMBOSACRAL REGION: ICD-10-CM

## 2020-01-29 DIAGNOSIS — F41.1 GENERALIZED ANXIETY DISORDER: ICD-10-CM

## 2020-01-29 PROCEDURE — 99214 OFFICE O/P EST MOD 30 MIN: CPT | Performed by: INTERNAL MEDICINE

## 2020-01-29 PROCEDURE — 3008F BODY MASS INDEX DOCD: CPT | Performed by: INTERNAL MEDICINE

## 2020-01-29 RX ORDER — BUSPIRONE HYDROCHLORIDE 5 MG/1
5 TABLET ORAL 3 TIMES DAILY PRN
Qty: 30 TABLET | Refills: 1 | Status: SHIPPED | OUTPATIENT
Start: 2020-01-29 | End: 2020-07-20 | Stop reason: SDUPTHER

## 2020-01-29 RX ORDER — CYCLOBENZAPRINE HCL 10 MG
10 TABLET ORAL DAILY PRN
Qty: 30 TABLET | Refills: 1 | Status: SHIPPED | OUTPATIENT
Start: 2020-01-29 | End: 2020-08-19 | Stop reason: SDUPTHER

## 2020-01-29 NOTE — ASSESSMENT & PLAN NOTE
Improved, may try taking Buspar more often, especially when she feels like binge eating  She sees her homeopathic doctor regularly

## 2020-01-29 NOTE — PROGRESS NOTES
Assessment/Plan:    Generalized anxiety disorder  Improved, may try taking Buspar more often, especially when she feels like binge eating  She sees her homeopathic doctor regularly  Current mild episode of major depressive disorder without prior episode ([facility] )  Stable  Class 2 obesity due to excess calories without serious comorbidity with body mass index (BMI) of 38 0 to 38 9 in adult  No weight change  Start walking daily  Discussed weight loss medications  Elevated LFTs  Continue low fat diet  ALT improved  Neck pain  Stable, encouraged to take muscle relaxant prn qHS  Diagnoses and all orders for this visit:    Encounter for long-term current use of medication  -     Vitamin B12; Future    Neck pain  -     cyclobenzaprine (FLEXERIL) 10 mg tablet; Take 1 tablet (10 mg total) by mouth daily as needed for muscle spasms    Intervertebral disc disorders with radiculopathy, lumbosacral region  -     cyclobenzaprine (FLEXERIL) 10 mg tablet; Take 1 tablet (10 mg total) by mouth daily as needed for muscle spasms    Muscle spasm  -     cyclobenzaprine (FLEXERIL) 10 mg tablet; Take 1 tablet (10 mg total) by mouth daily as needed for muscle spasms    Generalized anxiety disorder  -     busPIRone (BUSPAR) 5 mg tablet; Take 1 tablet (5 mg total) by mouth 3 (three) times a day as needed (anxiety)    Screening, lipid  -     Lipid panel; Future    Current mild episode of major depressive disorder without prior episode ([facility] )  -     Comprehensive metabolic panel; Future    Vitamin D insufficiency  -     Vitamin D 25 hydroxy; Future    Elevated LFTs      Follow up in 3 months or as needed  Subjective:      Patient ID: Mikael Cooper is a 38 y o  female  Kristopher Jacob reports that she has been experiencing more muscle and joint pains during the past 2 weeks  She has been taking Motrin as needed, about 4 tablets in 4 days  She does not take it regularly      She does not take Buspar often, has helped when she does take it  She finds her self binge eating when feeling anxious  She has not lost weight and is frustrated  She is also nervous about her liver enzymes  She saw her homeopathic doctor this week  She is asking if it is okay to receive HCG shots for weight loss  She would also like to start a water diet for 15 days to help with the weight  The following portions of the patient's history were reviewed and updated as appropriate: allergies, current medications, past medical history, past social history and problem list     Review of Systems   Constitutional: Negative for appetite change and fatigue  HENT: Negative for congestion and ear pain  Respiratory: Negative for cough and shortness of breath  Cardiovascular: Negative for chest pain  Gastrointestinal: Negative for abdominal pain and constipation  Genitourinary: Negative for dysuria  Musculoskeletal: Positive for arthralgias, myalgias, neck pain and neck stiffness  Neurological: Positive for headaches  Negative for dizziness  Psychiatric/Behavioral: Negative for confusion and sleep disturbance  The patient is nervous/anxious  Objective:      /80   Pulse 96   Temp (!) 96 6 °F (35 9 °C) (Oral)   Resp 18   Ht 5' 6" (1 676 m)   Wt 107 kg (235 lb 9 6 oz)   LMP 01/05/2020 (Exact Date)   SpO2 96%   BMI 38 03 kg/m²          Physical Exam   Constitutional: She is oriented to person, place, and time  She appears well-developed and well-nourished  HENT:   Head: Normocephalic and atraumatic  Eyes: Pupils are equal, round, and reactive to light  Cardiovascular: Normal rate, regular rhythm and normal heart sounds  Pulmonary/Chest: Effort normal and breath sounds normal  She has no wheezes  Abdominal: Soft  Bowel sounds are normal    Musculoskeletal: She exhibits no edema  Cervical back: She exhibits spasm  She exhibits normal range of motion, no tenderness and no pain     Neurological: She is alert and oriented to person, place, and time  Skin: Skin is warm  No rash noted  Psychiatric: She has a normal mood and affect  Her behavior is normal    Nursing note and vitals reviewed  Lab results reviewed with patient

## 2020-02-17 ENCOUNTER — HOSPITAL ENCOUNTER (OUTPATIENT)
Dept: MAMMOGRAPHY | Facility: CLINIC | Age: 45
Discharge: HOME | End: 2020-02-17
Payer: COMMERCIAL

## 2020-02-17 ENCOUNTER — HOSPITAL ENCOUNTER (OUTPATIENT)
Dept: ULTRASOUND IMAGING | Facility: CLINIC | Age: 45
Discharge: HOME | End: 2020-02-17
Payer: COMMERCIAL

## 2020-02-17 VITALS — BODY MASS INDEX: 37.77 KG/M2 | HEIGHT: 66 IN | WEIGHT: 235 LBS

## 2020-02-17 DIAGNOSIS — R92.8 ABNORMAL MAMMOGRAM: ICD-10-CM

## 2020-02-17 PROCEDURE — 77065 DX MAMMO INCL CAD UNI: CPT

## 2020-02-17 PROCEDURE — 76642 ULTRASOUND BREAST LIMITED: CPT

## 2020-02-17 PROCEDURE — G0279 TOMOSYNTHESIS, MAMMO: HCPCS

## 2020-04-06 ENCOUNTER — TELEMEDICINE (OUTPATIENT)
Dept: INTERNAL MEDICINE CLINIC | Facility: CLINIC | Age: 45
End: 2020-04-06
Payer: COMMERCIAL

## 2020-04-06 VITALS
DIASTOLIC BLOOD PRESSURE: 89 MMHG | SYSTOLIC BLOOD PRESSURE: 142 MMHG | TEMPERATURE: 97.9 F | WEIGHT: 235 LBS | HEART RATE: 99 BPM | HEIGHT: 66 IN | BODY MASS INDEX: 37.77 KG/M2

## 2020-04-06 DIAGNOSIS — F41.1 GENERALIZED ANXIETY DISORDER: ICD-10-CM

## 2020-04-06 DIAGNOSIS — J30.2 SEASONAL ALLERGIES: ICD-10-CM

## 2020-04-06 DIAGNOSIS — F32.0 CURRENT MILD EPISODE OF MAJOR DEPRESSIVE DISORDER WITHOUT PRIOR EPISODE (HCC): Primary | ICD-10-CM

## 2020-04-06 PROCEDURE — 99214 OFFICE O/P EST MOD 30 MIN: CPT | Performed by: INTERNAL MEDICINE

## 2020-04-06 RX ORDER — BUPROPION HYDROCHLORIDE 150 MG/1
150 TABLET ORAL DAILY
Qty: 90 TABLET | Refills: 1 | Status: SHIPPED | OUTPATIENT
Start: 2020-04-06 | End: 2020-04-07

## 2020-04-07 DIAGNOSIS — F41.1 GENERALIZED ANXIETY DISORDER: ICD-10-CM

## 2020-04-07 RX ORDER — BUPROPION HYDROCHLORIDE 150 MG/1
TABLET ORAL
Qty: 30 TABLET | Refills: 5 | Status: SHIPPED | OUTPATIENT
Start: 2020-04-07 | End: 2020-10-06 | Stop reason: SDUPTHER

## 2020-04-10 ENCOUNTER — TELEPHONE (OUTPATIENT)
Dept: INTERNAL MEDICINE CLINIC | Facility: CLINIC | Age: 45
End: 2020-04-10

## 2020-04-29 ENCOUNTER — TELEMEDICINE (OUTPATIENT)
Dept: INTERNAL MEDICINE CLINIC | Facility: CLINIC | Age: 45
End: 2020-04-29
Payer: COMMERCIAL

## 2020-04-29 VITALS
HEART RATE: 82 BPM | HEIGHT: 66 IN | TEMPERATURE: 98.4 F | DIASTOLIC BLOOD PRESSURE: 78 MMHG | WEIGHT: 227 LBS | BODY MASS INDEX: 36.48 KG/M2 | SYSTOLIC BLOOD PRESSURE: 131 MMHG

## 2020-04-29 DIAGNOSIS — M79.18 MYOFASCIAL PAIN SYNDROME, CERVICAL: ICD-10-CM

## 2020-04-29 DIAGNOSIS — E66.09 CLASS 2 OBESITY DUE TO EXCESS CALORIES WITHOUT SERIOUS COMORBIDITY WITH BODY MASS INDEX (BMI) OF 38.0 TO 38.9 IN ADULT: ICD-10-CM

## 2020-04-29 DIAGNOSIS — R79.89 ELEVATED LFTS: ICD-10-CM

## 2020-04-29 DIAGNOSIS — F41.1 GENERALIZED ANXIETY DISORDER: Primary | ICD-10-CM

## 2020-04-29 DIAGNOSIS — F32.0 CURRENT MILD EPISODE OF MAJOR DEPRESSIVE DISORDER WITHOUT PRIOR EPISODE (HCC): ICD-10-CM

## 2020-04-29 PROCEDURE — 99213 OFFICE O/P EST LOW 20 MIN: CPT | Performed by: INTERNAL MEDICINE

## 2020-07-20 ENCOUNTER — OFFICE VISIT (OUTPATIENT)
Dept: INTERNAL MEDICINE CLINIC | Facility: CLINIC | Age: 45
End: 2020-07-20
Payer: COMMERCIAL

## 2020-07-20 VITALS
HEIGHT: 66 IN | WEIGHT: 230 LBS | SYSTOLIC BLOOD PRESSURE: 120 MMHG | OXYGEN SATURATION: 98 % | RESPIRATION RATE: 18 BRPM | HEART RATE: 98 BPM | DIASTOLIC BLOOD PRESSURE: 92 MMHG | TEMPERATURE: 97.5 F | BODY MASS INDEX: 36.96 KG/M2

## 2020-07-20 DIAGNOSIS — M79.18 MYOFASCIAL PAIN SYNDROME, CERVICAL: ICD-10-CM

## 2020-07-20 DIAGNOSIS — R45.851 SUICIDAL THOUGHTS: Primary | ICD-10-CM

## 2020-07-20 DIAGNOSIS — F32.0 CURRENT MILD EPISODE OF MAJOR DEPRESSIVE DISORDER WITHOUT PRIOR EPISODE (HCC): ICD-10-CM

## 2020-07-20 DIAGNOSIS — E66.09 CLASS 2 OBESITY DUE TO EXCESS CALORIES WITHOUT SERIOUS COMORBIDITY WITH BODY MASS INDEX (BMI) OF 38.0 TO 38.9 IN ADULT: ICD-10-CM

## 2020-07-20 DIAGNOSIS — F41.1 GENERALIZED ANXIETY DISORDER: ICD-10-CM

## 2020-07-20 PROCEDURE — 99214 OFFICE O/P EST MOD 30 MIN: CPT | Performed by: INTERNAL MEDICINE

## 2020-07-20 PROCEDURE — 1036F TOBACCO NON-USER: CPT | Performed by: INTERNAL MEDICINE

## 2020-07-20 PROCEDURE — 3008F BODY MASS INDEX DOCD: CPT | Performed by: INTERNAL MEDICINE

## 2020-07-20 RX ORDER — BUSPIRONE HYDROCHLORIDE 5 MG/1
5 TABLET ORAL 3 TIMES DAILY PRN
Qty: 90 TABLET | Refills: 0 | Status: SHIPPED | OUTPATIENT
Start: 2020-07-20 | End: 2020-08-14

## 2020-07-20 NOTE — ASSESSMENT & PLAN NOTE
No plan  Stopped seeing therapist due to financial reasons  Fair family / friend support  Given information for suicide hotline

## 2020-07-20 NOTE — PATIENT INSTRUCTIONS
Look into ecouch for CBT (cognitive behavioral therapy)  · Exercise with the person  Exercise can lift his or her mood, increase energy, and make it easier to sleep at night  · Encourage the person to try new things  Adults who are open to new experiences handle stress and change better than those who are not

## 2020-07-20 NOTE — ASSESSMENT & PLAN NOTE
Instructed to take Wellbutrin qHS again  Consider adding venlafaxine (side effects with SSRI)  Encouraged to walk / exercise daily  Instructed not to restart other supplements for now

## 2020-07-20 NOTE — PROGRESS NOTES
Assessment/Plan:    Suicidal thoughts  No plan  Stopped seeing therapist due to financial reasons  Fair family / friend support  Given information for suicide hotline  Current mild episode of major depressive disorder without prior episode ([address] )  Instructed to take Wellbutrin qHS again  Consider adding venlafaxine (side effects with SSRI)  Encouraged to walk / exercise daily  Instructed not to restart other supplements for now  Generalized anxiety disorder  Improved, taking buspirone prn only  Myofascial pain syndrome, cervical  Takes muscle relaxant prn  Class 2 obesity due to excess calories without serious comorbidity with body mass index (BMI) of 38 0 to 38 9 in adult  No weight change  Elevated LFTs  ALT slightly elevated  Diagnoses and all orders for this visit:    Suicidal thoughts    Generalized anxiety disorder  -     busPIRone (BUSPAR) 5 mg tablet; Take 1 tablet (5 mg total) by mouth 3 (three) times a day as needed (anxiety)    Current mild episode of major depressive disorder without prior episode ([facility])    Myofascial pain syndrome, cervical    Class 2 obesity due to excess calories without serious comorbidity with body mass index (BMI) of 38 0 to 38 9 in adult      Follow up in 1 month or as needed  Subjective:      Patient ID: Rupinder Langston is a 38 y o  female  Winifred Roberson has not been doing well in the past month  She reports having suicidal thoughts since about a month ago  She is not sure if it is related to her medication, she started taking Wellbutrin and her supplements in the morning instead of the evening  No new events occurred at home  She reports she has no plan, says she "can imagine it    She stopped seeing her therapist about 2 months ago due to cost   She started working about a month ago, has 4 clients  She remains paranoid about the virus, reports feeling reluctant to return back to work but needs to  She feels happy while at work      She reports occasional palpitations, usually occurs at night  Denies any symptoms during the day  No chest pain or shortness of breath  Two weeks ago, she had a bad headache  She took an extra dose of the muscle relaxant which seems to help her neck pain and headache  The following portions of the patient's history were reviewed and updated as appropriate: allergies, current medications, past medical history, past social history and problem list     Review of Systems   Constitutional: Negative for activity change and appetite change  Respiratory: Negative for chest tightness and shortness of breath  Cardiovascular: Positive for palpitations  Negative for chest pain  Gastrointestinal: Negative for abdominal pain  Musculoskeletal: Positive for neck pain and neck stiffness  Psychiatric/Behavioral: Positive for dysphoric mood and sleep disturbance  The patient is not nervous/anxious  Objective:      /92   Pulse 98   Temp 97 5 °F (36 4 °C) (Tympanic)   Resp 18   Ht 5' 6" (1 676 m)   Wt 104 kg (230 lb)   SpO2 98%   BMI 37 12 kg/m²          Physical Exam   Constitutional: She is oriented to person, place, and time  She appears well-developed and well-nourished  She does not appear ill  No distress  HENT:   Head: Normocephalic  Pulmonary/Chest: Effort normal    Neurological: She is alert and oriented to person, place, and time  Skin: Skin is warm  Psychiatric: She has a normal mood and affect  Her speech is normal and behavior is normal  Thought content is delusional  Thought content is not paranoid  Cognition and memory are normal  She expresses suicidal ideation  She expresses no suicidal plans  She is attentive  Nursing note and vitals reviewed

## 2020-08-14 DIAGNOSIS — F41.1 GENERALIZED ANXIETY DISORDER: ICD-10-CM

## 2020-08-14 RX ORDER — BUSPIRONE HYDROCHLORIDE 5 MG/1
5 TABLET ORAL 3 TIMES DAILY PRN
Qty: 90 TABLET | Refills: 0 | Status: SHIPPED | OUTPATIENT
Start: 2020-08-14 | End: 2021-10-25 | Stop reason: SDUPTHER

## 2020-08-19 ENCOUNTER — OFFICE VISIT (OUTPATIENT)
Dept: INTERNAL MEDICINE CLINIC | Facility: CLINIC | Age: 45
End: 2020-08-19
Payer: COMMERCIAL

## 2020-08-19 VITALS
RESPIRATION RATE: 18 BRPM | OXYGEN SATURATION: 98 % | HEIGHT: 66 IN | HEART RATE: 105 BPM | WEIGHT: 233.4 LBS | TEMPERATURE: 96.5 F | DIASTOLIC BLOOD PRESSURE: 84 MMHG | BODY MASS INDEX: 37.51 KG/M2 | SYSTOLIC BLOOD PRESSURE: 130 MMHG

## 2020-08-19 DIAGNOSIS — E66.09 CLASS 2 OBESITY DUE TO EXCESS CALORIES WITHOUT SERIOUS COMORBIDITY WITH BODY MASS INDEX (BMI) OF 38.0 TO 38.9 IN ADULT: ICD-10-CM

## 2020-08-19 DIAGNOSIS — M54.2 NECK PAIN: ICD-10-CM

## 2020-08-19 DIAGNOSIS — F41.1 GENERALIZED ANXIETY DISORDER: ICD-10-CM

## 2020-08-19 DIAGNOSIS — F32.0 CURRENT MILD EPISODE OF MAJOR DEPRESSIVE DISORDER WITHOUT PRIOR EPISODE (HCC): ICD-10-CM

## 2020-08-19 DIAGNOSIS — M62.838 MUSCLE SPASM: ICD-10-CM

## 2020-08-19 DIAGNOSIS — M51.17 INTERVERTEBRAL DISC DISORDERS WITH RADICULOPATHY, LUMBOSACRAL REGION: ICD-10-CM

## 2020-08-19 DIAGNOSIS — N92.1 MENORRHAGIA WITH IRREGULAR CYCLE: Primary | ICD-10-CM

## 2020-08-19 PROBLEM — N92.0 HEAVY MENSTRUAL BLEEDING: Status: ACTIVE | Noted: 2020-08-19

## 2020-08-19 PROCEDURE — 99214 OFFICE O/P EST MOD 30 MIN: CPT | Performed by: INTERNAL MEDICINE

## 2020-08-19 PROCEDURE — 3008F BODY MASS INDEX DOCD: CPT | Performed by: INTERNAL MEDICINE

## 2020-08-19 PROCEDURE — 1036F TOBACCO NON-USER: CPT | Performed by: INTERNAL MEDICINE

## 2020-08-19 RX ORDER — CYCLOBENZAPRINE HCL 10 MG
10 TABLET ORAL DAILY PRN
Qty: 30 TABLET | Refills: 5 | Status: SHIPPED | OUTPATIENT
Start: 2020-08-19 | End: 2020-12-14 | Stop reason: SDUPTHER

## 2020-08-19 NOTE — ASSESSMENT & PLAN NOTE
Ongoing for the past few weeks, may need CBC  Keep appointment with gynecology next month  Migraine headaches worse since heavy bleeding

## 2020-08-19 NOTE — PROGRESS NOTES
Assessment/Plan:    Suicidal thoughts  Resolved since taking bupropionn in the evening  Monitor for recurrence  Heavy menstrual bleeding  Ongoing for the past few weeks, may need CBC  Keep appointment with gynecology next month  Migraine headaches worse since heavy bleeding  Generalized anxiety disorder  Stable, on bupropion  Takes buspirone prn only  Myofascial pain syndrome, cervical  May continue with muscle relaxant as needed  Current mild episode of major depressive disorder without prior episode ([facility] )  As above  Class 2 obesity due to excess calories without serious comorbidity with body mass index (BMI) of 38 0 to 38 9 in adult  No weight change  Elevated LFTs  Repeat labs later this year  Diagnoses and all orders for this visit:    Menorrhagia with irregular cycle    Neck pain  -     cyclobenzaprine (FLEXERIL) 10 mg tablet; Take 1 tablet (10 mg total) by mouth daily as needed for muscle spasms    Intervertebral disc disorders with radiculopathy, lumbosacral region  -     cyclobenzaprine (FLEXERIL) 10 mg tablet; Take 1 tablet (10 mg total) by mouth daily as needed for muscle spasms    Muscle spasm  -     cyclobenzaprine (FLEXERIL) 10 mg tablet; Take 1 tablet (10 mg total) by mouth daily as needed for muscle spasms    Generalized anxiety disorder    Class 2 obesity due to excess calories without serious comorbidity with body mass index (BMI) of 38 0 to 38 9 in adult    Current mild episode of major depressive disorder without prior episode ([facility] )      Follow up in 2 months or as needed  Subjective:      Patient ID: Kiran Camara is a 38 y o  female  Dai Snide is feeling better, anxiety wise  She started seeing her counselor again, has been seeing her every 2 weeks  Since she switched her Wellbutrin does to early afternoon, she reports suicidal thoughts have resolved  She initially was taking it is at bedtime but this affected her sleep, now taking it late in the afternoon  She takes buspirone as needed, does not take this daily  She feels her mood is much better  She reports heavy menstrual bleeding for the past 3-4 weeks  She has had intermittent spotting since 2 months ago  She has occasional low back and pelvic pain  She reports bleeding is now clots, somewhat lighter  She noted more frequent migraine headaches since constant menstrual bleeding  She does take the muscle relaxant as needed which seems to help  The following portions of the patient's history were reviewed and updated as appropriate: allergies, current medications, past medical history, past social history and problem list     Review of Systems   Constitutional: Negative for activity change and appetite change  Respiratory: Negative for shortness of breath  Cardiovascular: Negative for palpitations  Genitourinary: Positive for pelvic pain and vaginal bleeding  Negative for dysuria  Neurological: Negative for dizziness  Psychiatric/Behavioral: Negative for agitation, confusion, dysphoric mood, sleep disturbance and suicidal ideas  The patient is not nervous/anxious  Objective:      /84   Pulse 105   Temp (!) 96 5 °F (35 8 °C)   Resp 18   Ht 5' 6" (1 676 m)   Wt 106 kg (233 lb 6 4 oz)   SpO2 98%   BMI 37 67 kg/m²          Physical Exam  Vitals signs and nursing note reviewed  Constitutional:       General: She is not in acute distress  Appearance: She is well-developed  HENT:      Head: Normocephalic and atraumatic  Eyes:      Pupils: Pupils are equal, round, and reactive to light  Cardiovascular:      Rate and Rhythm: Normal rate and regular rhythm  Heart sounds: Normal heart sounds  Pulmonary:      Effort: Pulmonary effort is normal       Breath sounds: Normal breath sounds  No wheezing  Neurological:      Mental Status: She is alert and oriented to person, place, and time     Psychiatric:         Mood and Affect: Mood normal  Mood is not anxious or depressed  Speech: Speech normal          Behavior: Behavior normal            BMI Counseling: Body mass index is 37 67 kg/m²  The BMI is above normal  Nutrition recommendations include decreasing overall calorie intake and 3-5 servings of fruits/vegetables daily  Exercise recommendations include exercising 3-5 times per week

## 2020-09-14 ENCOUNTER — OFFICE VISIT (OUTPATIENT)
Dept: OBGYN CLINIC | Facility: CLINIC | Age: 45
End: 2020-09-14
Payer: COMMERCIAL

## 2020-09-14 VITALS
HEIGHT: 66 IN | BODY MASS INDEX: 37.25 KG/M2 | WEIGHT: 231.8 LBS | TEMPERATURE: 98.3 F | SYSTOLIC BLOOD PRESSURE: 128 MMHG | DIASTOLIC BLOOD PRESSURE: 72 MMHG

## 2020-09-14 DIAGNOSIS — N93.9 ABNORMAL UTERINE BLEEDING: Primary | ICD-10-CM

## 2020-09-14 PROCEDURE — 99203 OFFICE O/P NEW LOW 30 MIN: CPT | Performed by: OBSTETRICS & GYNECOLOGY

## 2020-09-15 NOTE — PROGRESS NOTES
Assessment/Plan:   Diagnoses and all orders for this visit:    Abnormal uterine bleeding  -     US pelvis complete w transvaginal; Future    Reviewed with patient recommendation for imaging via ultrasound for any structural cause for change in bleeding pattern  Recommended return to office for EMB for evaluation and pap smear to be performed due to lack of gyn care for last 15 years  Patient would prefer to wait until her EMB procedure for a gyn examination  She is agreeable to having EMB and pap smear performed at next visit  Subjective:    Patient ID: Shasha Foreman is a 54 y o  female  Chief Complaint   Patient presents with    Menstrual Problem     Patient reports period in  that lasted until September  She reports it was very heavy with clots  Patient is a 52yo  presenting today as a new patient for a gyn problem visit  She reports change in her menstrual cycle and bleeding pattern  She reports a long history of irregular menses, though reports her bleeding occurred every month but in an unpredictable pattern  She states that her bleeding changed and she experienced bleeding for approximately 2 months straight  She reports that her bleeding started out heavy for at least 2 weeks and states that she had to change her diva cup every hour due to the extent of her bleeding  She reports that her bleeding has stopped last week  She reports that it has been an extended amount of time since she has seen a gynecologist, approximately 15 years  She denies abnormal pap smears but has not had one in 15 years  She states that she is nervous for gyn exams and would prefer to wait to have an exam until her evaluation is discussed         The following portions of the patient's history were reviewed and updated as appropriate: allergies, current medications, past family history, past medical history, past social history, past surgical history and problem list     Review of Systems   Constitutional: Positive for fatigue  Negative for activity change, appetite change and unexpected weight change  Respiratory: Negative for cough and shortness of breath  Cardiovascular: Negative for chest pain  Gastrointestinal: Negative for abdominal pain, constipation, diarrhea, nausea and vomiting  Genitourinary: Positive for menstrual problem and vaginal bleeding  Negative for difficulty urinating, dyspareunia, frequency, pelvic pain, vaginal discharge and vaginal pain  Neurological: Negative for dizziness, weakness, light-headedness and headaches  Psychiatric/Behavioral: Negative  Objective:  /72   Temp 98 3 °F (36 8 °C)   Ht 5' 6" (1 676 m)   Wt 105 kg (231 lb 12 8 oz)   LMP 06/08/2020 (Approximate)   BMI 37 41 kg/m²   Physical Exam  Constitutional:       General: She is not in acute distress  Appearance: Normal appearance  She is obese  She is not diaphoretic  Genitourinary:      Genitourinary Comments: Deferred on patient's request   HENT:      Head: Normocephalic and atraumatic  Pulmonary:      Effort: Pulmonary effort is normal  No respiratory distress  Neurological:      Mental Status: She is alert and oriented to person, place, and time  Psychiatric:         Mood and Affect: Mood normal          Behavior: Behavior normal          Thought Content:  Thought content normal          Judgment: Judgment normal

## 2020-09-18 ENCOUNTER — HOSPITAL ENCOUNTER (OUTPATIENT)
Dept: ULTRASOUND IMAGING | Facility: HOSPITAL | Age: 45
Discharge: HOME | End: 2020-09-18
Attending: OBSTETRICS & GYNECOLOGY
Payer: COMMERCIAL

## 2020-09-18 DIAGNOSIS — N93.9 ABNORMAL UTERINE BLEEDING: ICD-10-CM

## 2020-09-18 PROCEDURE — 76856 US EXAM PELVIC COMPLETE: CPT

## 2020-09-18 PROCEDURE — 76830 TRANSVAGINAL US NON-OB: CPT

## 2020-09-21 ENCOUNTER — PROCEDURE VISIT (OUTPATIENT)
Dept: OBGYN CLINIC | Facility: CLINIC | Age: 45
End: 2020-09-21
Payer: COMMERCIAL

## 2020-09-21 VITALS
TEMPERATURE: 97.9 F | SYSTOLIC BLOOD PRESSURE: 132 MMHG | DIASTOLIC BLOOD PRESSURE: 74 MMHG | BODY MASS INDEX: 37.77 KG/M2 | WEIGHT: 234 LBS

## 2020-09-21 DIAGNOSIS — Z12.4 ENCOUNTER FOR SCREENING FOR CERVICAL CANCER: Primary | ICD-10-CM

## 2020-09-21 DIAGNOSIS — N93.9 ABNORMAL UTERINE BLEEDING: ICD-10-CM

## 2020-09-21 DIAGNOSIS — Z11.51 SCREENING FOR HPV (HUMAN PAPILLOMAVIRUS): ICD-10-CM

## 2020-09-21 LAB — SL AMB POCT URINE HCG: NEGATIVE

## 2020-09-21 PROCEDURE — 58100 BIOPSY OF UTERUS LINING: CPT | Performed by: OBSTETRICS & GYNECOLOGY

## 2020-09-21 PROCEDURE — 87624 HPV HI-RISK TYP POOLED RSLT: CPT | Performed by: OBSTETRICS & GYNECOLOGY

## 2020-09-21 PROCEDURE — G0145 SCR C/V CYTO,THINLAYER,RESCR: HCPCS | Performed by: OBSTETRICS & GYNECOLOGY

## 2020-09-21 PROCEDURE — 88305 TISSUE EXAM BY PATHOLOGIST: CPT | Performed by: PATHOLOGY

## 2020-09-21 PROCEDURE — 81025 URINE PREGNANCY TEST: CPT | Performed by: OBSTETRICS & GYNECOLOGY

## 2020-09-21 NOTE — PROGRESS NOTES
Endometrial biopsy    Date/Time: 9/21/2020 10:44 AM  Performed by: Buster Albarran MD  Authorized by: Buster Albarran MD     Consent:     Consent obtained:  Verbal and written    Consent given by:  Patient    Procedural risks discussed:  Bleeding, infection, failure rate and repeat procedure (uterine perforation, inadequate sample, cramping pain)    Patient questions answered: yes      Patient agrees, verbalizes understanding, and wants to proceed: yes    Indication:     Indications: Other disorder of menstruation and other abnormal bleeding from female genital tract      Indications comment:  Abnormal uterine bleeding  Procedure:     Procedure: endometrial biopsy with Pipelle      A bivalve speculum was placed in the vagina: yes      Cervix cleaned and prepped: yes      A paracervical block was performed: no      An intracervical block was performed: no      The cervix was dilated: no      Uterus sounded: yes      Uterus sound depth (cm):  11    Specimen collected: specimen collected and sent to pathology      Patient tolerated procedure well with no complications: yes    Findings:     Uterus size:  9-10 weeks    Cervix: normal    Comments:      Pap collected prior to collection of endometrial biopsy as patient has not had gyn care in 15 years  Follow up on 10/5/2020 for annual gyn visit and follow up of results         Problem List Items Addressed This Visit     None      Visit Diagnoses     Encounter for screening for cervical cancer     -  Primary    Relevant Orders    Liquid-based pap, screening    Abnormal uterine bleeding        Relevant Orders    POCT urine HCG (Completed)    Endometrial biopsy    Tissue Exam    Screening for HPV (human papillomavirus)        Relevant Orders    Liquid-based pap, screening

## 2020-10-04 LAB
HPV HR 12 DNA CVX QL NAA+PROBE: NEGATIVE
HPV16 DNA CVX QL NAA+PROBE: NEGATIVE
HPV18 DNA CVX QL NAA+PROBE: NEGATIVE

## 2020-10-05 ENCOUNTER — OFFICE VISIT (OUTPATIENT)
Dept: OBGYN CLINIC | Facility: CLINIC | Age: 45
End: 2020-10-05
Payer: COMMERCIAL

## 2020-10-05 VITALS
WEIGHT: 230.4 LBS | HEIGHT: 67 IN | DIASTOLIC BLOOD PRESSURE: 84 MMHG | SYSTOLIC BLOOD PRESSURE: 152 MMHG | BODY MASS INDEX: 36.16 KG/M2 | TEMPERATURE: 97.9 F

## 2020-10-05 DIAGNOSIS — Z00.00 ENCOUNTER FOR WELL WOMAN EXAM WITHOUT GYNECOLOGICAL EXAM: Primary | ICD-10-CM

## 2020-10-05 DIAGNOSIS — N92.1 MENORRHAGIA WITH IRREGULAR CYCLE: ICD-10-CM

## 2020-10-05 LAB
LAB AP GYN PRIMARY INTERPRETATION: NORMAL
Lab: NORMAL

## 2020-10-05 PROCEDURE — S0612 ANNUAL GYNECOLOGICAL EXAMINA: HCPCS | Performed by: OBSTETRICS & GYNECOLOGY

## 2020-10-06 ENCOUNTER — OFFICE VISIT (OUTPATIENT)
Dept: INTERNAL MEDICINE CLINIC | Facility: CLINIC | Age: 45
End: 2020-10-06
Payer: COMMERCIAL

## 2020-10-06 VITALS
HEIGHT: 67 IN | BODY MASS INDEX: 36.07 KG/M2 | HEART RATE: 113 BPM | RESPIRATION RATE: 18 BRPM | WEIGHT: 229.8 LBS | DIASTOLIC BLOOD PRESSURE: 84 MMHG | SYSTOLIC BLOOD PRESSURE: 140 MMHG | OXYGEN SATURATION: 98 % | TEMPERATURE: 97.7 F

## 2020-10-06 DIAGNOSIS — R45.851 SUICIDAL THOUGHTS: ICD-10-CM

## 2020-10-06 DIAGNOSIS — F41.1 GENERALIZED ANXIETY DISORDER: ICD-10-CM

## 2020-10-06 DIAGNOSIS — M79.18 MYOFASCIAL PAIN SYNDROME, CERVICAL: ICD-10-CM

## 2020-10-06 DIAGNOSIS — F32.0 CURRENT MILD EPISODE OF MAJOR DEPRESSIVE DISORDER WITHOUT PRIOR EPISODE (HCC): Primary | ICD-10-CM

## 2020-10-06 PROCEDURE — 1036F TOBACCO NON-USER: CPT | Performed by: INTERNAL MEDICINE

## 2020-10-06 PROCEDURE — 99213 OFFICE O/P EST LOW 20 MIN: CPT | Performed by: INTERNAL MEDICINE

## 2020-10-06 RX ORDER — BUPROPION HYDROCHLORIDE 150 MG/1
150 TABLET ORAL DAILY
Qty: 30 TABLET | Refills: 5 | Status: SHIPPED | OUTPATIENT
Start: 2020-10-06 | End: 2020-12-07 | Stop reason: ALTCHOICE

## 2020-10-06 RX ORDER — VENLAFAXINE HYDROCHLORIDE 37.5 MG/1
37.5 CAPSULE, EXTENDED RELEASE ORAL
Qty: 30 CAPSULE | Refills: 1 | Status: SHIPPED | OUTPATIENT
Start: 2020-10-06 | End: 2020-12-07 | Stop reason: ALTCHOICE

## 2020-12-07 ENCOUNTER — OFFICE VISIT (OUTPATIENT)
Dept: INTERNAL MEDICINE CLINIC | Facility: CLINIC | Age: 45
End: 2020-12-07
Payer: COMMERCIAL

## 2020-12-07 VITALS
RESPIRATION RATE: 18 BRPM | BODY MASS INDEX: 36.32 KG/M2 | TEMPERATURE: 98 F | HEART RATE: 110 BPM | OXYGEN SATURATION: 98 % | SYSTOLIC BLOOD PRESSURE: 140 MMHG | DIASTOLIC BLOOD PRESSURE: 82 MMHG | WEIGHT: 231.4 LBS | HEIGHT: 67 IN

## 2020-12-07 DIAGNOSIS — Z00.00 HEALTH MAINTENANCE EXAMINATION: Primary | ICD-10-CM

## 2020-12-07 DIAGNOSIS — F32.0 CURRENT MILD EPISODE OF MAJOR DEPRESSIVE DISORDER WITHOUT PRIOR EPISODE (HCC): ICD-10-CM

## 2020-12-07 DIAGNOSIS — E66.09 CLASS 2 OBESITY DUE TO EXCESS CALORIES WITHOUT SERIOUS COMORBIDITY WITH BODY MASS INDEX (BMI) OF 38.0 TO 38.9 IN ADULT: ICD-10-CM

## 2020-12-07 DIAGNOSIS — Z23 NEED FOR INFLUENZA VACCINATION: ICD-10-CM

## 2020-12-07 DIAGNOSIS — M79.18 MYOFASCIAL PAIN SYNDROME, CERVICAL: ICD-10-CM

## 2020-12-07 DIAGNOSIS — Z12.31 ENCOUNTER FOR SCREENING MAMMOGRAM FOR BREAST CANCER: ICD-10-CM

## 2020-12-07 DIAGNOSIS — F41.1 GENERALIZED ANXIETY DISORDER: ICD-10-CM

## 2020-12-07 PROBLEM — N83.202 CYST OF LEFT OVARY: Status: ACTIVE | Noted: 2020-12-07

## 2020-12-07 PROCEDURE — 90471 IMMUNIZATION ADMIN: CPT | Performed by: INTERNAL MEDICINE

## 2020-12-07 PROCEDURE — 1036F TOBACCO NON-USER: CPT | Performed by: INTERNAL MEDICINE

## 2020-12-07 PROCEDURE — 3725F SCREEN DEPRESSION PERFORMED: CPT | Performed by: INTERNAL MEDICINE

## 2020-12-07 PROCEDURE — 90686 IIV4 VACC NO PRSV 0.5 ML IM: CPT | Performed by: INTERNAL MEDICINE

## 2020-12-07 PROCEDURE — 3008F BODY MASS INDEX DOCD: CPT | Performed by: INTERNAL MEDICINE

## 2020-12-07 PROCEDURE — 99396 PREV VISIT EST AGE 40-64: CPT | Performed by: INTERNAL MEDICINE

## 2020-12-14 DIAGNOSIS — M62.838 MUSCLE SPASM: ICD-10-CM

## 2020-12-14 DIAGNOSIS — M51.17 INTERVERTEBRAL DISC DISORDERS WITH RADICULOPATHY, LUMBOSACRAL REGION: ICD-10-CM

## 2020-12-14 DIAGNOSIS — M54.2 NECK PAIN: ICD-10-CM

## 2020-12-14 RX ORDER — CYCLOBENZAPRINE HCL 10 MG
10 TABLET ORAL DAILY PRN
Qty: 90 TABLET | Refills: 1 | Status: SHIPPED | OUTPATIENT
Start: 2020-12-14 | End: 2021-10-25 | Stop reason: SDUPTHER

## 2020-12-24 ENCOUNTER — TELEPHONE (OUTPATIENT)
Dept: INTERNAL MEDICINE CLINIC | Facility: CLINIC | Age: 45
End: 2020-12-24

## 2021-01-15 DIAGNOSIS — Z23 ENCOUNTER FOR IMMUNIZATION: ICD-10-CM

## 2021-02-08 ENCOUNTER — OFFICE VISIT (OUTPATIENT)
Dept: INTERNAL MEDICINE CLINIC | Facility: CLINIC | Age: 46
End: 2021-02-08
Payer: COMMERCIAL

## 2021-02-08 VITALS
TEMPERATURE: 95.8 F | HEART RATE: 107 BPM | SYSTOLIC BLOOD PRESSURE: 132 MMHG | WEIGHT: 242 LBS | OXYGEN SATURATION: 100 % | DIASTOLIC BLOOD PRESSURE: 82 MMHG | HEIGHT: 67 IN | BODY MASS INDEX: 37.98 KG/M2

## 2021-02-08 DIAGNOSIS — M79.18 MYOFASCIAL PAIN SYNDROME, CERVICAL: ICD-10-CM

## 2021-02-08 DIAGNOSIS — E66.09 CLASS 2 OBESITY DUE TO EXCESS CALORIES WITHOUT SERIOUS COMORBIDITY WITH BODY MASS INDEX (BMI) OF 38.0 TO 38.9 IN ADULT: ICD-10-CM

## 2021-02-08 DIAGNOSIS — Z00.00 LABORATORY EXAMINATION ORDERED AS PART OF A ROUTINE GENERAL MEDICAL EXAMINATION: ICD-10-CM

## 2021-02-08 DIAGNOSIS — R79.89 ELEVATED LFTS: ICD-10-CM

## 2021-02-08 DIAGNOSIS — F41.1 GENERALIZED ANXIETY DISORDER: Primary | ICD-10-CM

## 2021-02-08 DIAGNOSIS — Z71.9 HEALTH COUNSELING: ICD-10-CM

## 2021-02-08 DIAGNOSIS — E55.9 VITAMIN D INSUFFICIENCY: ICD-10-CM

## 2021-02-08 DIAGNOSIS — F32.0 CURRENT MILD EPISODE OF MAJOR DEPRESSIVE DISORDER WITHOUT PRIOR EPISODE (HCC): ICD-10-CM

## 2021-02-08 PROBLEM — R45.851 SUICIDAL THOUGHTS: Status: RESOLVED | Noted: 2020-07-20 | Resolved: 2021-02-08

## 2021-02-08 PROCEDURE — 3008F BODY MASS INDEX DOCD: CPT | Performed by: INTERNAL MEDICINE

## 2021-02-08 PROCEDURE — 1036F TOBACCO NON-USER: CPT | Performed by: INTERNAL MEDICINE

## 2021-02-08 PROCEDURE — 99214 OFFICE O/P EST MOD 30 MIN: CPT | Performed by: INTERNAL MEDICINE
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
## 2021-07-27 ENCOUNTER — IMMUNIZATIONS (OUTPATIENT)
Dept: FAMILY MEDICINE CLINIC | Facility: HOSPITAL | Age: 46
End: 2021-07-27

## 2021-07-27 DIAGNOSIS — Z23 ENCOUNTER FOR IMMUNIZATION: Primary | ICD-10-CM

## 2021-07-27 PROCEDURE — 0001A SARS-COV-2 / COVID-19 MRNA VACCINE (PFIZER-BIONTECH) 30 MCG: CPT

## 2021-07-27 PROCEDURE — 91300 SARS-COV-2 / COVID-19 MRNA VACCINE (PFIZER-BIONTECH) 30 MCG: CPT

## 2021-08-17 ENCOUNTER — IMMUNIZATIONS (OUTPATIENT)
Dept: FAMILY MEDICINE CLINIC | Facility: HOSPITAL | Age: 46
End: 2021-08-17

## 2021-08-17 DIAGNOSIS — Z23 ENCOUNTER FOR IMMUNIZATION: Primary | ICD-10-CM

## 2021-08-17 PROCEDURE — 0002A SARS-COV-2 / COVID-19 MRNA VACCINE (PFIZER-BIONTECH) 30 MCG: CPT

## 2021-08-17 PROCEDURE — 91300 SARS-COV-2 / COVID-19 MRNA VACCINE (PFIZER-BIONTECH) 30 MCG: CPT

## 2021-10-04 DIAGNOSIS — S81.801A LEG WOUND, RIGHT, INITIAL ENCOUNTER: Primary | ICD-10-CM

## 2021-10-04 RX ORDER — SULFAMETHOXAZOLE AND TRIMETHOPRIM 800; 160 MG/1; MG/1
1 TABLET ORAL EVERY 12 HOURS SCHEDULED
Qty: 14 TABLET | Refills: 0 | Status: SHIPPED | OUTPATIENT
Start: 2021-10-04 | End: 2021-10-11

## 2021-10-25 ENCOUNTER — OFFICE VISIT (OUTPATIENT)
Dept: INTERNAL MEDICINE CLINIC | Facility: CLINIC | Age: 46
End: 2021-10-25

## 2021-10-25 VITALS
WEIGHT: 235.4 LBS | OXYGEN SATURATION: 99 % | TEMPERATURE: 97.1 F | SYSTOLIC BLOOD PRESSURE: 130 MMHG | BODY MASS INDEX: 36.95 KG/M2 | HEIGHT: 67 IN | DIASTOLIC BLOOD PRESSURE: 100 MMHG | HEART RATE: 100 BPM

## 2021-10-25 DIAGNOSIS — M54.16 LUMBAR RADICULOPATHY: ICD-10-CM

## 2021-10-25 DIAGNOSIS — M79.18 MYOFASCIAL PAIN SYNDROME, CERVICAL: ICD-10-CM

## 2021-10-25 DIAGNOSIS — M51.17 INTERVERTEBRAL DISC DISORDERS WITH RADICULOPATHY, LUMBOSACRAL REGION: ICD-10-CM

## 2021-10-25 DIAGNOSIS — F32.0 CURRENT MILD EPISODE OF MAJOR DEPRESSIVE DISORDER WITHOUT PRIOR EPISODE (HCC): ICD-10-CM

## 2021-10-25 DIAGNOSIS — F41.1 GENERALIZED ANXIETY DISORDER: ICD-10-CM

## 2021-10-25 DIAGNOSIS — M62.838 MUSCLE SPASM: ICD-10-CM

## 2021-10-25 DIAGNOSIS — Z71.9 HEALTH COUNSELING: ICD-10-CM

## 2021-10-25 DIAGNOSIS — E66.09 CLASS 2 OBESITY DUE TO EXCESS CALORIES WITHOUT SERIOUS COMORBIDITY WITH BODY MASS INDEX (BMI) OF 38.0 TO 38.9 IN ADULT: ICD-10-CM

## 2021-10-25 DIAGNOSIS — M54.2 NECK PAIN: ICD-10-CM

## 2021-10-25 DIAGNOSIS — S81.801S WOUND, OPEN, LEG, RIGHT, SEQUELA: Primary | ICD-10-CM

## 2021-10-25 DIAGNOSIS — R11.0 NAUSEA: ICD-10-CM

## 2021-10-25 PROBLEM — F43.9 STRESS AT HOME: Status: ACTIVE | Noted: 2021-10-25

## 2021-10-25 PROCEDURE — 99213 OFFICE O/P EST LOW 20 MIN: CPT | Performed by: INTERNAL MEDICINE

## 2021-10-25 RX ORDER — GABAPENTIN 100 MG/1
100 CAPSULE ORAL
Qty: 90 CAPSULE | Refills: 0 | Status: SHIPPED | OUTPATIENT
Start: 2021-10-25

## 2021-10-25 RX ORDER — BUSPIRONE HYDROCHLORIDE 5 MG/1
5 TABLET ORAL 3 TIMES DAILY PRN
Qty: 90 TABLET | Refills: 0 | Status: SHIPPED | OUTPATIENT
Start: 2021-10-25 | End: 2021-11-19

## 2021-10-25 RX ORDER — CYCLOBENZAPRINE HCL 10 MG
10 TABLET ORAL DAILY PRN
Qty: 90 TABLET | Refills: 1 | Status: SHIPPED | OUTPATIENT
Start: 2021-10-25

## 2021-11-23 ENCOUNTER — TELEPHONE (OUTPATIENT)
Dept: INTERNAL MEDICINE CLINIC | Facility: CLINIC | Age: 46
End: 2021-11-23

## 2021-11-26 DIAGNOSIS — M79.18 MYOFASCIAL PAIN SYNDROME, CERVICAL: Primary | ICD-10-CM
